# Patient Record
Sex: FEMALE | Race: WHITE | NOT HISPANIC OR LATINO
[De-identification: names, ages, dates, MRNs, and addresses within clinical notes are randomized per-mention and may not be internally consistent; named-entity substitution may affect disease eponyms.]

---

## 2020-08-07 PROBLEM — Z00.00 ENCOUNTER FOR PREVENTIVE HEALTH EXAMINATION: Status: ACTIVE | Noted: 2020-08-07

## 2020-08-18 ENCOUNTER — APPOINTMENT (OUTPATIENT)
Dept: HEART AND VASCULAR | Facility: CLINIC | Age: 65
End: 2020-08-18
Payer: MEDICARE

## 2020-08-18 DIAGNOSIS — Z78.9 OTHER SPECIFIED HEALTH STATUS: ICD-10-CM

## 2020-08-18 DIAGNOSIS — I49.3 VENTRICULAR PREMATURE DEPOLARIZATION: ICD-10-CM

## 2020-08-18 DIAGNOSIS — Z82.3 FAMILY HISTORY OF STROKE: ICD-10-CM

## 2020-08-18 PROCEDURE — 99443: CPT

## 2020-08-18 RX ORDER — NADOLOL 20 MG/1
20 TABLET ORAL DAILY
Refills: 0 | Status: ACTIVE | COMMUNITY
Start: 2020-08-18

## 2020-08-18 RX ORDER — VERAPAMIL HYDROCHLORIDE 120 MG/1
120 CAPSULE, EXTENDED RELEASE ORAL
Refills: 0 | Status: ACTIVE | COMMUNITY
Start: 2020-08-18

## 2020-08-18 NOTE — PHYSICAL EXAM
[General Appearance - Well Developed] : well developed [Normal Appearance] : normal appearance [Well Groomed] : well groomed [General Appearance - Well Nourished] : well nourished [No Deformities] : no deformities [General Appearance - In No Acute Distress] : no acute distress [Normal Conjunctiva] : the conjunctiva exhibited no abnormalities [] : no respiratory distress [Respiration, Rhythm And Depth] : normal respiratory rhythm and effort [Exaggerated Use Of Accessory Muscles For Inspiration] : no accessory muscle use [Skin Color & Pigmentation] : normal skin color and pigmentation [Impaired Insight] : insight and judgment were intact [Oriented To Time, Place, And Person] : oriented to person, place, and time [Affect] : the affect was normal [Mood] : the mood was normal [No Anxiety] : not feeling anxious

## 2020-08-21 NOTE — HISTORY OF PRESENT ILLNESS
[FreeTextEntry1] : 65 year old active female with exercised induced syncope and PVC/NSVT s/p ablation x2 in [place] with second ablation c/b tamponade s/p surgical intervention, who presents for initial evaluation. \par \par She is active and regularly bikes at a high intensity.  9/2019 she had a syncopal event while biking and went to a hospital in [place] with frequent PVCs.  Monitor with 7.5% PVC burden and cardiac MRI with a normal EF, no scar or infiltrative disease.  She had an EP study 12/2019 with frequent monomorphic PVCs s/p ablation on posterior medial papillary muscle using Carto.  She did well for a few weeks post ablation and then had recurrent PVCs on her [employer] mobile device.  She then had a treadmill stress test with bouts of non sustained ventricular tachycardia that started with the same PVC that was evident during her ablation.  She was offered to take flecainide but did not like the way it made her feel.  She then went back for a repeat ablation 1/2020 in [place].  EPS study with earliest EGM on posteromedial pap the pacemap was poor - but the pacemap was 99% on the anterolateral pap despite there being no early presystolic potential.  Ablation was performed there and the PVC was suppressed.  Post procedure she developed a hemopericadium with tamponade requiring epicardial drainage and ultimately she went ot the OR with 2 small apical/lateral LV punctures s/p patch.  She was discharged 2 days later.  She did well for a few more weeks with recurrent palpitations.  She was started on Verapamil with some improvement but in her symptoms. She still has palpitations post exercise.  She had a stress test with recurrent PVCs and was recommended to get a second opinion for a repeat ablation.  \par Currently no chest pain, syncope, near syncope, SOB, orthopnea or PND.

## 2020-08-21 NOTE — DISCUSSION/SUMMARY
[FreeTextEntry1] : 65 year old active female with exercised induced syncope and PVC/NSVT s/p ablation x2 in [place] with second ablation c/b tamponade s/p surgical intervention, who presents for initial evaluation. She has had recurrent events on BB and CCB and did not like flecainide.  We discussed a repeat ablation with utilization of cardioinsight technology to help localize the PVC prior to invasive measures EPS.  WE discussed the procedure in detail including risks, benefits and alternatives.  She knows she will have to come to [place] 2 days prior for a COVID swab and a few days after her procedure.  We will organize this with the lab and cardioinsight team and get back to her with a date in September.  She knows to call with any questions or concerns. \par

## 2020-09-22 ENCOUNTER — LABORATORY RESULT (OUTPATIENT)
Age: 65
End: 2020-09-22

## 2020-09-25 ENCOUNTER — RESULT REVIEW (OUTPATIENT)
Age: 65
End: 2020-09-25

## 2020-09-25 ENCOUNTER — APPOINTMENT (OUTPATIENT)
Dept: CT IMAGING | Facility: HOSPITAL | Age: 65
End: 2020-09-25

## 2020-09-25 ENCOUNTER — INPATIENT (INPATIENT)
Facility: HOSPITAL | Age: 65
LOS: 0 days | Discharge: HOME | DRG: 274 | End: 2020-09-26
Attending: INTERNAL MEDICINE | Admitting: INTERNAL MEDICINE
Payer: MEDICARE

## 2020-09-25 VITALS — HEIGHT: 68 IN | WEIGHT: 147.93 LBS

## 2020-09-25 DIAGNOSIS — I49.3 VENTRICULAR PREMATURE DEPOLARIZATION: ICD-10-CM

## 2020-09-25 DIAGNOSIS — Z98.890 OTHER SPECIFIED POSTPROCEDURAL STATES: Chronic | ICD-10-CM

## 2020-09-25 LAB
ANION GAP SERPL CALC-SCNC: 9 MMOL/L — SIGNIFICANT CHANGE UP (ref 5–17)
APTT BLD: 39.6 SEC — HIGH (ref 27.5–35.5)
BLD GP AB SCN SERPL QL: NEGATIVE — SIGNIFICANT CHANGE UP
BUN SERPL-MCNC: 24 MG/DL — HIGH (ref 7–23)
CALCIUM SERPL-MCNC: 10 MG/DL — SIGNIFICANT CHANGE UP (ref 8.4–10.5)
CHLORIDE SERPL-SCNC: 105 MMOL/L — SIGNIFICANT CHANGE UP (ref 96–108)
CO2 SERPL-SCNC: 29 MMOL/L — SIGNIFICANT CHANGE UP (ref 22–31)
CREAT SERPL-MCNC: 0.8 MG/DL — SIGNIFICANT CHANGE UP (ref 0.5–1.3)
GLUCOSE SERPL-MCNC: 98 MG/DL — SIGNIFICANT CHANGE UP (ref 70–99)
HCT VFR BLD CALC: 44.9 % — SIGNIFICANT CHANGE UP (ref 34.5–45)
HGB BLD-MCNC: 14.4 G/DL — SIGNIFICANT CHANGE UP (ref 11.5–15.5)
INR BLD: 1.11 — SIGNIFICANT CHANGE UP (ref 0.88–1.16)
MCHC RBC-ENTMCNC: 27.3 PG — SIGNIFICANT CHANGE UP (ref 27–34)
MCHC RBC-ENTMCNC: 32.1 GM/DL — SIGNIFICANT CHANGE UP (ref 32–36)
MCV RBC AUTO: 85.2 FL — SIGNIFICANT CHANGE UP (ref 80–100)
NRBC # BLD: 0 /100 WBCS — SIGNIFICANT CHANGE UP (ref 0–0)
PLATELET # BLD AUTO: 162 K/UL — SIGNIFICANT CHANGE UP (ref 150–400)
POTASSIUM SERPL-MCNC: 5.2 MMOL/L — SIGNIFICANT CHANGE UP (ref 3.5–5.3)
POTASSIUM SERPL-SCNC: 5.2 MMOL/L — SIGNIFICANT CHANGE UP (ref 3.5–5.3)
PROTHROM AB SERPL-ACNC: 13.2 SEC — SIGNIFICANT CHANGE UP (ref 10.6–13.6)
RBC # BLD: 5.27 M/UL — HIGH (ref 3.8–5.2)
RBC # FLD: 13.6 % — SIGNIFICANT CHANGE UP (ref 10.3–14.5)
RH IG SCN BLD-IMP: POSITIVE — SIGNIFICANT CHANGE UP
SODIUM SERPL-SCNC: 143 MMOL/L — SIGNIFICANT CHANGE UP (ref 135–145)
WBC # BLD: 4.7 K/UL — SIGNIFICANT CHANGE UP (ref 3.8–10.5)
WBC # FLD AUTO: 4.7 K/UL — SIGNIFICANT CHANGE UP (ref 3.8–10.5)

## 2020-09-25 PROCEDURE — 93462 L HRT CATH TRNSPTL PUNCTURE: CPT

## 2020-09-25 PROCEDURE — 93655 ICAR CATH ABLTJ DSCRT ARRHYT: CPT

## 2020-09-25 PROCEDURE — 71250 CT THORAX DX C-: CPT | Mod: 26

## 2020-09-25 PROCEDURE — 93623 PRGRMD STIMJ&PACG IV RX NFS: CPT | Mod: 26

## 2020-09-25 PROCEDURE — 93621 COMP EP EVL L PAC&REC C SINS: CPT | Mod: 26

## 2020-09-25 PROCEDURE — 93654 COMPRE EP EVAL TX VT: CPT

## 2020-09-25 PROCEDURE — 93662 INTRACARDIAC ECG (ICE): CPT | Mod: 26

## 2020-09-25 RX ORDER — VERAPAMIL HCL 240 MG
1 CAPSULE, EXTENDED RELEASE PELLETS 24 HR ORAL
Qty: 0 | Refills: 0 | DISCHARGE

## 2020-09-25 RX ORDER — ASPIRIN/CALCIUM CARB/MAGNESIUM 324 MG
81 TABLET ORAL DAILY
Refills: 0 | Status: DISCONTINUED | OUTPATIENT
Start: 2020-09-26 | End: 2020-09-26

## 2020-09-25 RX ORDER — ASPIRIN/CALCIUM CARB/MAGNESIUM 324 MG
325 TABLET ORAL ONCE
Refills: 0 | Status: COMPLETED | OUTPATIENT
Start: 2020-09-25 | End: 2020-09-26

## 2020-09-25 RX ORDER — APIXABAN 2.5 MG/1
5 TABLET, FILM COATED ORAL EVERY 12 HOURS
Refills: 0 | Status: COMPLETED | OUTPATIENT
Start: 2020-09-25 | End: 2020-09-26

## 2020-09-25 RX ORDER — VERAPAMIL HCL 240 MG
120 CAPSULE, EXTENDED RELEASE PELLETS 24 HR ORAL DAILY
Refills: 0 | Status: DISCONTINUED | OUTPATIENT
Start: 2020-09-25 | End: 2020-09-26

## 2020-09-25 RX ORDER — NADOLOL 80 MG/1
0.5 TABLET ORAL
Qty: 0 | Refills: 0 | DISCHARGE

## 2020-09-25 NOTE — H&P ADULT - ASSESSMENT
65 year old active female with exercised induced syncope and PVC/NSVT s/p ablation x2 in [place] with second ablation c/b tamponade s/p surgical intervention, who presents for EPS/Cardioinste and an ablation.

## 2020-09-25 NOTE — PROGRESS NOTE ADULT - SUBJECTIVE AND OBJECTIVE BOX
Pre-op Diagnosis:  Frequent symptomatic idiopathic PVCs and NSVT    Post-op Diagnosis:  Same    Procedure:  Catheter ablation of PVCs    Electrophysiologist:  VALARIE Morton MD    Assistant:  ISRAEL Kenny MD    Anesthesia:  Procedural sedation by anesthesiologist    Access:  RFV, RFA  Perclose used to close RFA access    Description:  Successful catheter ablation of PVCs    Complications:  None    EBL:  10 cc    Disposition:  Stable    Plan:  - Bedrest x 6 hrs  - Eliquis 5 mg PO tonight and tomorrow morning only (2 total doses)  - Aspirin 325 mg PO x 1 tonight  - Aspirin 81 mg PO daily starting tomorrow  - Resume all other home medications

## 2020-09-25 NOTE — H&P ADULT - HISTORY OF PRESENT ILLNESS
65 year old active female with exercised induced syncope and PVC/NSVT s/p ablation x2 in [place] with second ablation c/b tamponade s/p surgical intervention, who presents for EPS/Cardioinste and an ablation.    She is active and regularly bikes at a high intensity.  9/2019 she had a syncopal event while biking and went to a hospital in [place] with frequent PVCs.  Monitor with 7.5% PVC burden and cardiac MRI with a normal EF, no scar or infiltrative disease.  She had an EP study 12/2019 with frequent monomorphic PVCs s/p ablation on posterior medial papillary muscle using Carto.  She did well for a few weeks post ablation and then had recurrent PVCs on her [employer] mobile device.  She then had a treadmill stress test with bouts of non sustained ventricular tachycardia that started with the same PVC that was evident during her ablation.  She was offered to take flecainide but did not like the way it made her feel.  She then went back for a repeat ablation 1/2020 in [place].  EPS study with earliest EGM on posteromedial pap the pacemap was poor - but the pacemap was 99% on the anterolateral pap despite there being no early presystolic potential.  Ablation was performed there and the PVC was suppressed.  Post procedure she developed a hemopericadium with tamponade requiring epicardial drainage and ultimately she went ot the OR with 2 small apical/lateral LV punctures s/p patch.  She was discharged 2 days later.  She did well for a few more weeks with recurrent palpitations.  She was started on Verapamil with some improvement but in her symptoms. She still has palpitations post exercise.  She had a stress test with recurrent PVCs and was recommended to get a second opinion for a repeat ablation.    Currently no chest pain, syncope, near syncope, SOB, orthopnea or PND.

## 2020-09-26 ENCOUNTER — TRANSCRIPTION ENCOUNTER (OUTPATIENT)
Age: 65
End: 2020-09-26

## 2020-09-26 VITALS — TEMPERATURE: 98 F

## 2020-09-26 LAB
ANION GAP SERPL CALC-SCNC: 10 MMOL/L — SIGNIFICANT CHANGE UP (ref 5–17)
BUN SERPL-MCNC: 18 MG/DL — SIGNIFICANT CHANGE UP (ref 7–23)
CALCIUM SERPL-MCNC: 9.6 MG/DL — SIGNIFICANT CHANGE UP (ref 8.4–10.5)
CHLORIDE SERPL-SCNC: 105 MMOL/L — SIGNIFICANT CHANGE UP (ref 96–108)
CO2 SERPL-SCNC: 26 MMOL/L — SIGNIFICANT CHANGE UP (ref 22–31)
CREAT SERPL-MCNC: 0.77 MG/DL — SIGNIFICANT CHANGE UP (ref 0.5–1.3)
GLUCOSE SERPL-MCNC: 86 MG/DL — SIGNIFICANT CHANGE UP (ref 70–99)
HCT VFR BLD CALC: 42.3 % — SIGNIFICANT CHANGE UP (ref 34.5–45)
HCV AB S/CO SERPL IA: 0.09 S/CO — SIGNIFICANT CHANGE UP
HCV AB SERPL-IMP: SIGNIFICANT CHANGE UP
HGB BLD-MCNC: 13.4 G/DL — SIGNIFICANT CHANGE UP (ref 11.5–15.5)
MAGNESIUM SERPL-MCNC: 2.2 MG/DL — SIGNIFICANT CHANGE UP (ref 1.6–2.6)
MCHC RBC-ENTMCNC: 26.5 PG — LOW (ref 27–34)
MCHC RBC-ENTMCNC: 31.7 GM/DL — LOW (ref 32–36)
MCV RBC AUTO: 83.8 FL — SIGNIFICANT CHANGE UP (ref 80–100)
NRBC # BLD: 0 /100 WBCS — SIGNIFICANT CHANGE UP (ref 0–0)
PLATELET # BLD AUTO: 144 K/UL — LOW (ref 150–400)
POTASSIUM SERPL-MCNC: 4.2 MMOL/L — SIGNIFICANT CHANGE UP (ref 3.5–5.3)
POTASSIUM SERPL-SCNC: 4.2 MMOL/L — SIGNIFICANT CHANGE UP (ref 3.5–5.3)
RBC # BLD: 5.05 M/UL — SIGNIFICANT CHANGE UP (ref 3.8–5.2)
RBC # FLD: 14.1 % — SIGNIFICANT CHANGE UP (ref 10.3–14.5)
SODIUM SERPL-SCNC: 141 MMOL/L — SIGNIFICANT CHANGE UP (ref 135–145)
WBC # BLD: 5.38 K/UL — SIGNIFICANT CHANGE UP (ref 3.8–10.5)
WBC # FLD AUTO: 5.38 K/UL — SIGNIFICANT CHANGE UP (ref 3.8–10.5)

## 2020-09-26 RX ORDER — ASPIRIN/CALCIUM CARB/MAGNESIUM 324 MG
1 TABLET ORAL
Qty: 0 | Refills: 0 | DISCHARGE
Start: 2020-09-26

## 2020-09-26 RX ADMIN — APIXABAN 5 MILLIGRAM(S): 2.5 TABLET, FILM COATED ORAL at 12:05

## 2020-09-26 RX ADMIN — Medication 120 MILLIGRAM(S): at 12:05

## 2020-09-26 RX ADMIN — Medication 325 MILLIGRAM(S): at 06:55

## 2020-09-26 RX ADMIN — APIXABAN 5 MILLIGRAM(S): 2.5 TABLET, FILM COATED ORAL at 01:00

## 2020-09-26 NOTE — DISCHARGE NOTE PROVIDER - CARE PROVIDER_API CALL
Lawrence Morton)  Cardiac Electrophysiology  [address], [address] [place] [address]  Phone: [phone]  Fax: [phone]  Follow Up Time:     JOSEFA CATES  Cardiac Electrophysiology  Phone: [phone]  Fax: [phone]  Follow Up Time:

## 2020-09-26 NOTE — DISCHARGE NOTE PROVIDER - HOSPITAL COURSE
65 year old active female with exercised induced syncope and PVC/NSVT s/p ablation x2 in [place] with second ablation c/b tamponade s/p surgical intervention, who presents for EPS/Cardioinste and an ablation.    She is active and regularly bikes at a high intensity.  9/2019 she had a syncopal event while biking and went to a hospital in [place] with frequent PVCs.  Monitor with 7.5% PVC burden and cardiac MRI with a normal EF, no scar or infiltrative disease.  She had an EP study 12/2019 with frequent monomorphic PVCs s/p ablation on posterior medial papillary muscle using Carto.  She did well for a few weeks post ablation and then had recurrent PVCs on her [employer] mobile device.  She then had a treadmill stress test with bouts of non sustained ventricular tachycardia that started with the same PVC that was evident during her ablation.  She was offered to take flecainide but did not like the way it made her feel.  She then went back for a repeat ablation 1/2020 in [place].  EPS study with earliest EGM on posteromedial pap the pacemap was poor - but the pacemap was 99% on the anterolateral pap despite there being no early presystolic potential.  Ablation was performed there and the PVC was suppressed.  Post procedure she developed a hemopericadium with tamponade requiring epicardial drainage and ultimately she went ot the OR with 2 small apical/lateral LV punctures s/p patch.  She was discharged 2 days later.  She did well for a few more weeks with recurrent palpitations.  She was started on Verapamil with some improvement but in her symptoms. She still has palpitations post exercise.  She had a stress test with recurrent PVCs and was recommended to get a second opinion for a repeat ablation.    Currently no chest pain, syncope, near syncope, SOB, orthopnea or PND.       Pt underwent successful PVC ablation on 9/25/2020.  Pt remained stable overnight, no significant tele events, groin and labs remained stable.  Pt will start Aspirin 81mg daily and will continue Home dose Verapamil SR 120mg reducing frequency to once daily, and will continue Home Nadolol.      Pt cleared for discharge per Dr Case and will follow up with Dr Morton on wednesday 9/30/2020 and will the follow up with her Cardiologist Dr Vinicius Small in [place].  Pt instructed to continue her quarantine due to travel from [place] risk area.

## 2020-09-26 NOTE — DISCHARGE NOTE PROVIDER - NSDCFUADDINST_GEN_ALL_CORE_FT
Your COVID PCR test was negative on 9/23/2020 prior to the procedure.  Please continue your Quarantine and monitoring yourself for any fever, chills, loss of taste/smell until 10/5/2020.  If you experience any of these symptoms please contact your Primary Physician to discuss possibility of Covid infection.

## 2020-09-26 NOTE — DISCHARGE NOTE PROVIDER - NSDCMRMEDTOKEN_GEN_ALL_CORE_FT
Aspirin Enteric Coated 81 mg oral delayed release tablet: 1 tab(s) orally once a day  nadolol 20 mg oral tablet: 0.5 tab(s) orally once a day  verapamil 120 mg/24 hours oral capsule, extended release: 1 cap(s) orally once a day

## 2020-09-26 NOTE — DISCHARGE NOTE PROVIDER - NSDCCPCAREPLAN_GEN_ALL_CORE_FT
PRINCIPAL DISCHARGE DIAGNOSIS  Diagnosis: PVCs (premature ventricular contractions)  Assessment and Plan of Treatment: You underwent a PVC ablation on 9/25/2020.  You have been started on Enteric Coated Aspirin 81mg daily which can be purchased over the counter.  You should continue taking your Nadolol and Verapamil.  Your Verapamil dose has been changed to Once daily post ablation.    Please follow up with Dr Morton on Wednesday 9/30/2020 as scheduled and [facility] your return to [place].    Contact Dr Morton or return to Emergency room if you experience any dizziness, palpiations, fainting.     The catheter from your groin was removed and bleeding was stopped by manual pressure.  Wash the site daily with soap and water.  There is no need to put on a bandage.      Call the Electrophysiology Team at [phone] if any of following occur pertaining to your vascular access site:  Bleeding or hematoma formation (collection of blood under the skin), drainage or redness at the puncture site, numbness, decrease in strength, coolness or pale coloration of skin of the leg or hand.

## 2020-09-26 NOTE — DISCHARGE NOTE NURSING/CASE MANAGEMENT/SOCIAL WORK - PATIENT PORTAL LINK FT
You can access the FollowMyHealth Patient Portal offered by [facility] by registering at the following website: [email or web address]. By joining [facility]’s FollowMyHealth portal, you will also be able to view your health information using other applications (apps) compatible with our system.

## 2020-09-30 ENCOUNTER — NON-APPOINTMENT (OUTPATIENT)
Age: 65
End: 2020-09-30

## 2020-09-30 ENCOUNTER — APPOINTMENT (OUTPATIENT)
Dept: HEART AND VASCULAR | Facility: CLINIC | Age: 65
End: 2020-09-30
Payer: MEDICARE

## 2020-09-30 VITALS
WEIGHT: 148 LBS | HEIGHT: 68.5 IN | HEART RATE: 76 BPM | BODY MASS INDEX: 22.17 KG/M2 | DIASTOLIC BLOOD PRESSURE: 61 MMHG | SYSTOLIC BLOOD PRESSURE: 112 MMHG | TEMPERATURE: 98.8 F

## 2020-09-30 PROBLEM — I49.3 VENTRICULAR PREMATURE DEPOLARIZATION: Chronic | Status: ACTIVE | Noted: 2020-09-25

## 2020-09-30 PROCEDURE — 99214 OFFICE O/P EST MOD 30 MIN: CPT | Mod: 25

## 2020-09-30 PROCEDURE — 93000 ELECTROCARDIOGRAM COMPLETE: CPT

## 2020-10-08 DIAGNOSIS — I49.3 VENTRICULAR PREMATURE DEPOLARIZATION: ICD-10-CM

## 2020-10-08 PROCEDURE — C1759: CPT

## 2020-10-08 PROCEDURE — 85730 THROMBOPLASTIN TIME PARTIAL: CPT

## 2020-10-08 PROCEDURE — 80048 BASIC METABOLIC PNL TOTAL CA: CPT

## 2020-10-08 PROCEDURE — 85610 PROTHROMBIN TIME: CPT

## 2020-10-08 PROCEDURE — C2630: CPT

## 2020-10-08 PROCEDURE — C1894: CPT

## 2020-10-08 PROCEDURE — 86803 HEPATITIS C AB TEST: CPT

## 2020-10-08 PROCEDURE — C1769: CPT

## 2020-10-08 PROCEDURE — C1730: CPT

## 2020-10-08 PROCEDURE — 86901 BLOOD TYPING SEROLOGIC RH(D): CPT

## 2020-10-08 PROCEDURE — C1760: CPT

## 2020-10-08 PROCEDURE — 83735 ASSAY OF MAGNESIUM: CPT

## 2020-10-08 PROCEDURE — 85027 COMPLETE CBC AUTOMATED: CPT

## 2020-10-08 PROCEDURE — 71250 CT THORAX DX C-: CPT

## 2020-10-08 PROCEDURE — 36415 COLL VENOUS BLD VENIPUNCTURE: CPT

## 2020-10-08 PROCEDURE — 86900 BLOOD TYPING SEROLOGIC ABO: CPT

## 2020-10-08 PROCEDURE — 86850 RBC ANTIBODY SCREEN: CPT

## 2020-10-09 ENCOUNTER — TRANSCRIPTION ENCOUNTER (OUTPATIENT)
Age: 65
End: 2020-10-09

## 2020-10-12 NOTE — DISCUSSION/SUMMARY
[FreeTextEntry1] : 65 year old active female with exercised induced syncope and PVC/NSVT s/p ablation x2 in [place] with second ablation c/b tamponade s/p surgical intervention, who underwent a PVC ablation at [facility] 9/2020, who presents for follow up.  Overall, she is doing well with infrequent palpitations.  She was reassured that her procedure went well and having infrequent early beats is not uncommon.  No changes in her current medications.  She is going back to [place] and will follow up with her EP there for a monitor in a few weeks.  She knows to call with any questions or concerns.

## 2020-10-12 NOTE — REASON FOR VISIT
[Follow-Up - From Hospitalization] : follow-up of a recent hospitalization for [FreeTextEntry1] : PVC

## 2020-10-12 NOTE — PHYSICAL EXAM
[General Appearance - Well Developed] : well developed [Normal Appearance] : normal appearance [Well Groomed] : well groomed [General Appearance - Well Nourished] : well nourished [No Deformities] : no deformities [General Appearance - In No Acute Distress] : no acute distress [Normal Conjunctiva] : the conjunctiva exhibited no abnormalities [Normal Oral Mucosa] : normal oral mucosa [] : no respiratory distress [Respiration, Rhythm And Depth] : normal respiratory rhythm and effort [Exaggerated Use Of Accessory Muscles For Inspiration] : no accessory muscle use [Auscultation Breath Sounds / Voice Sounds] : lungs were clear to auscultation bilaterally [5th Left ICS - MCL] : palpated at the 5th LICS in the midclavicular line [Normal] : normal [No Precordial Heave] : no precordial heave was noted [Normal Rate] : normal [Rhythm Regular] : regular [Normal S1] : normal S1 [Normal S2] : normal S2 [No Pitting Edema] : no pitting edema present [Abnormal Walk] : normal gait [Cyanosis, Localized] : no localized cyanosis [Skin Color & Pigmentation] : normal skin color and pigmentation [Oriented To Time, Place, And Person] : oriented to person, place, and time [Impaired Insight] : insight and judgment were intact [Affect] : the affect was normal [Mood] : the mood was normal [No Anxiety] : not feeling anxious [Apical Thrill] : no thrill palpable at the apex [Click] : no click [Distant] : the heart sounds were ~L not distant [Pericardial Rub] : no pericardial rub

## 2020-10-12 NOTE — HISTORY OF PRESENT ILLNESS
[FreeTextEntry1] : \par 65 year old active female with exercised induced syncope and PVC/NSVT s/p ablation x2 in [place] with second ablation c/b tamponade s/p surgical intervention, who underwent a PVC ablation at [facility] 9/2020, who presents for follow up.\par \par She is active and regularly bikes at a high intensity.  9/2019 she had a syncopal event while biking and went to a hospital in [place] with frequent PVCs.  Monitor with 7.5% PVC burden and cardiac MRI with a normal EF, no scar or infiltrative disease.  She had an EP study 12/2019 with frequent monomorphic \par PVCs s/p ablation on posterior medial papillary muscle using Carto.  She did well for a few weeks post ablation and then had recurrent PVCs on her [employer] mobile device.  She then had a treadmill stress test with bouts of non sustained ventricular tachycardia that started with the same PVC that was evident during her ablation.  She was offered to take flecainide but did not like the way it made her feel.  She then went back for a repeat ablation 1/2020 in [place].  EPS study with earliest EGM on posteromedial pap the pacemap was poor - but the pacemap was 99% on the anterolateral pap despite there being no early presystolic potential.  Ablation was performed there and the PVC was suppressed.  Post procedure she developed a hemopericadium with tamponade requiring epicardial drainage and ultimately she went ot the OR with 2 small apical/lateral LV punctures s/p patch.  She was discharged 2 days later.  She \par did well for a few more weeks with recurrent palpitations.  She was started on Verapamil with some improvement but in her symptoms. She still has palpitations post exercise.  She had a stress test with recurrent PVCs and was recommended to get a second opinion for a repeat ablation. She states overall she notes an improvement in the way she feels, but still has some early beats.  \par Currently no chest pain, syncope, near syncope, SOB, orthopnea or PND. \par  \par

## 2021-01-07 NOTE — DISCHARGE NOTE PROVIDER - CARE PROVIDERS DIRECT ADDRESSES
Render Risk Assessment In Note?: no
Detail Level: Detailed
Additional Notes: If regimen fails, we will consider Ketoconazole cream.
[email or web address]

## 2021-11-04 NOTE — H&P ADULT - RS GEN PE MLT RESP DETAILS PC
clear to auscultation bilaterally Detail Level: Generalized Detail Level: Zone Sunscreen Recommendations: Suggest using sunscreen of SPF of 30 or higher

## 2024-03-26 NOTE — H&P ADULT - REASON FOR ADMISSION
well developed, well nourished , in no acute distress , ambulating without difficulty , normal communication ability
EPS and an ablation  of PVCs

## 2025-07-08 NOTE — PATIENT PROFILE ADULT - HOME ACCESSIBILITY CONCERNS
Thank you for allowing us to participate in your care today. Your workup did not show evidence of a dangerous neurologic or cardiac problem.  Please take the medications as prescribed. Follow up care is nearly always required after a visit to the Emergency Department. Please follow up with your primary care physician as well as a rheumatologist for further assessment. It is your responsibility to call the number(s) provided for a follow up appointment. Your diagnosis today is a provisional one based on information available to the Emergency Physician today. The diagnosis may change as more information becomes available to your personal physician or other healthcare providers. If you develop any new, worsening, or concerning symptoms of illness, and are unable to follow up with a private physician, please return here or to the nearest Emergency Department for further care. Specifically, please return to emergency department if you develop:      - Severe, worsening headache   - New vision changes, weakness, numbness tingling   - Difficulty walking   - Recurrent uncontrolled vomiting         none